# Patient Record
Sex: FEMALE | Race: BLACK OR AFRICAN AMERICAN | NOT HISPANIC OR LATINO
[De-identification: names, ages, dates, MRNs, and addresses within clinical notes are randomized per-mention and may not be internally consistent; named-entity substitution may affect disease eponyms.]

---

## 2022-05-17 ENCOUNTER — DASHBOARD ENCOUNTERS (OUTPATIENT)
Age: 22
End: 2022-05-17

## 2022-05-17 ENCOUNTER — LAB OUTSIDE AN ENCOUNTER (OUTPATIENT)
Dept: URBAN - METROPOLITAN AREA CLINIC 52 | Facility: CLINIC | Age: 22
End: 2022-05-17

## 2022-05-17 ENCOUNTER — OFFICE VISIT (OUTPATIENT)
Dept: URBAN - METROPOLITAN AREA CLINIC 52 | Facility: CLINIC | Age: 22
End: 2022-05-17
Payer: MEDICAID

## 2022-05-17 VITALS
WEIGHT: 99.8 LBS | BODY MASS INDEX: 18.84 KG/M2 | TEMPERATURE: 97.5 F | DIASTOLIC BLOOD PRESSURE: 55 MMHG | OXYGEN SATURATION: 100 % | HEIGHT: 61 IN | HEART RATE: 78 BPM | SYSTOLIC BLOOD PRESSURE: 89 MMHG

## 2022-05-17 DIAGNOSIS — R93.5 ABNORMAL CT OF THE ABDOMEN: ICD-10-CM

## 2022-05-17 DIAGNOSIS — R63.4 WEIGHT LOSS: ICD-10-CM

## 2022-05-17 PROCEDURE — 99213 OFFICE O/P EST LOW 20 MIN: CPT | Performed by: INTERNAL MEDICINE

## 2022-05-17 RX ORDER — NORELGESTROMIN AND ETHINYL ESTRADIOL 35; 150 UG/MG; UG/MG
AS DIRECTED PATCH TRANSDERMAL
Status: ACTIVE | COMMUNITY

## 2022-05-17 NOTE — PHYSICAL EXAM GASTROINTESTINAL
Abdomen , soft, nontender, nondistended , no guarding or rigidity , no masses palpable , normal bowel sounds , Liver and Spleen , no hepatomegaly present , no hepatosplenomegaly , liver nontender , spleen not palpable , Abdomen , soft, nontender, nondistended , no guarding or rigidity , no masses palpable , normal bowel sounds , Liver and Spleen,  no hepatosplenomegaly , liver nontender

## 2022-05-17 NOTE — HPI-TODAY'S VISIT:
[facility] Mid March Had GB removal. Has seen surgeon post op. Concerned about weight. Lost few pounds after surgery. No abd pain, diarrhea, dysphagia. Occ heartburn. 3/12/22-EGD-NL X mild ecchymoses proximally CT-SB dilated to 2.5 cm with fluid HIDA EF 21%. Sono-gallstones. SBFT-NL after the CT

## 2022-05-17 NOTE — PHYSICAL EXAM HENT:
Head,  normocephalic,  atraumatic,  Face,  Face within normal limits,  Ears,  External ears within normal limits,  Nose/Nasopharynx,  External nose  normal appearance,  nares patent,  no nasal discharge,  Mouth and Throat,  Oral cavity appearance normal,  Breath odor normal,  Lips,  Appearance normal , Head, normocephalic, atraumatic, Face, Face within normal limits, Ears, External ears within normal limits

## 2022-05-17 NOTE — PHYSICAL EXAM CHEST:
no lesions,  no deformities,  no traumatic injuries,  no significant scars are present,  chest wall non-tender,  no masses present, breathing is unlabored without accessory muscle use,normal breath sounds , chest wall non-tender, breathing is unlabored without accessory muscle use, normal breath sounds

## 2022-05-19 LAB
A/G RATIO: 1.4
ALBUMIN: 4.1
ALKALINE PHOSPHATASE: 56
ALT (SGPT): 11
AST (SGOT): 15
BASO (ABSOLUTE): 0
BASOS: 1
BILIRUBIN, TOTAL: 0.2
BUN/CREATININE RATIO: 23
BUN: 14
CALCIUM: 9.5
CARBON DIOXIDE, TOTAL: 22
CHLORIDE: 108
CREATININE: 0.6
DEAMIDATED GLIADIN ABS, IGA: 5
EGFR: 129
EOS (ABSOLUTE): 0.1
EOS: 2
FREE THYROXINE INDEX: 2.3
GLOBULIN, TOTAL: 2.9
GLUCOSE: 69
HEMATOCRIT: 38.6
HEMATOLOGY COMMENTS:: (no result)
HEMOGLOBIN: 12.1
IMMATURE CELLS: (no result)
IMMATURE GRANS (ABS): 0
IMMATURE GRANULOCYTES: 0
IMMUNOGLOBULIN A, QN, SERUM: 111
LYMPHS (ABSOLUTE): 2.7
LYMPHS: 40
MCH: 28.8
MCHC: 31.3
MCV: 92
MONOCYTES(ABSOLUTE): 0.4
MONOCYTES: 7
NEUTROPHILS (ABSOLUTE): 3.5
NEUTROPHILS: 50
NRBC: (no result)
PLATELETS: 337
POTASSIUM: 4.5
PROTEIN, TOTAL: 7
RBC: 4.2
RDW: 11.8
SODIUM: 145
T-TRANSGLUTAMINASE (TTG) IGA: <2
T3 UPTAKE: 26
THYROXINE (T4): 8.8
WBC: 6.8

## 2022-06-01 ENCOUNTER — TELEPHONE ENCOUNTER (OUTPATIENT)
Dept: URBAN - METROPOLITAN AREA CLINIC 92 | Facility: CLINIC | Age: 22
End: 2022-06-01

## 2022-06-01 ENCOUNTER — OFFICE VISIT (OUTPATIENT)
Dept: URBAN - METROPOLITAN AREA SURGERY CENTER 17 | Facility: SURGERY CENTER | Age: 22
End: 2022-06-01
Payer: MEDICAID

## 2022-06-01 DIAGNOSIS — R93.3 ABN FINDINGS-GI TRACT: ICD-10-CM

## 2022-06-01 PROBLEM — 79890006: Status: ACTIVE | Noted: 2022-06-01

## 2022-06-01 PROCEDURE — 45378 DIAGNOSTIC COLONOSCOPY: CPT | Performed by: INTERNAL MEDICINE

## 2022-06-01 PROCEDURE — G8907 PT DOC NO EVENTS ON DISCHARG: HCPCS | Performed by: INTERNAL MEDICINE

## 2022-06-01 RX ORDER — NORELGESTROMIN AND ETHINYL ESTRADIOL 35; 150 UG/MG; UG/MG
AS DIRECTED PATCH TRANSDERMAL
Status: ACTIVE | COMMUNITY

## 2022-06-01 RX ORDER — MIRTAZAPINE 7.5 MG/1
1 TABLET AT BEDTIME. INCREASE DOSE BY 1 TABLET NIGHTLY EACH WEEK UP TO 4 PER NIGHT TABLET, FILM COATED ORAL ONCE A DAY
Qty: 70 TABLET | Refills: 2 | OUTPATIENT
Start: 2022-06-01